# Patient Record
Sex: MALE | Race: ASIAN | Employment: UNEMPLOYED | URBAN - METROPOLITAN AREA
[De-identification: names, ages, dates, MRNs, and addresses within clinical notes are randomized per-mention and may not be internally consistent; named-entity substitution may affect disease eponyms.]

---

## 2020-03-26 ENCOUNTER — HOSPITAL ENCOUNTER (EMERGENCY)
Facility: CLINIC | Age: 25
Discharge: HOME | End: 2020-03-27
Attending: EMERGENCY MEDICINE | Admitting: EMERGENCY MEDICINE
Payer: COMMERCIAL

## 2020-03-26 DIAGNOSIS — T78.40XA ALLERGIC REACTION, INITIAL ENCOUNTER: ICD-10-CM

## 2020-03-26 PROCEDURE — 25000125 ZZHC RX 250: Performed by: EMERGENCY MEDICINE

## 2020-03-26 PROCEDURE — 96372 THER/PROPH/DIAG INJ SC/IM: CPT

## 2020-03-26 PROCEDURE — 96374 THER/PROPH/DIAG INJ IV PUSH: CPT

## 2020-03-26 PROCEDURE — 96375 TX/PRO/DX INJ NEW DRUG ADDON: CPT

## 2020-03-26 PROCEDURE — 99284 EMERGENCY DEPT VISIT MOD MDM: CPT | Mod: 25

## 2020-03-26 PROCEDURE — 96361 HYDRATE IV INFUSION ADD-ON: CPT

## 2020-03-26 RX ORDER — DIPHENHYDRAMINE HYDROCHLORIDE 50 MG/ML
25 INJECTION INTRAMUSCULAR; INTRAVENOUS ONCE
Status: COMPLETED | OUTPATIENT
Start: 2020-03-26 | End: 2020-03-27

## 2020-03-26 RX ORDER — METHYLPREDNISOLONE SODIUM SUCCINATE 125 MG/2ML
125 INJECTION, POWDER, LYOPHILIZED, FOR SOLUTION INTRAMUSCULAR; INTRAVENOUS ONCE
Status: COMPLETED | OUTPATIENT
Start: 2020-03-26 | End: 2020-03-27

## 2020-03-26 RX ORDER — LIDOCAINE 40 MG/G
CREAM TOPICAL
Status: DISCONTINUED | OUTPATIENT
Start: 2020-03-26 | End: 2020-03-27 | Stop reason: HOSPADM

## 2020-03-26 RX ADMIN — EPINEPHRINE 0.3 MG: 1 INJECTION INTRAMUSCULAR; INTRAVENOUS; SUBCUTANEOUS at 23:57

## 2020-03-26 ASSESSMENT — ENCOUNTER SYMPTOMS
SHORTNESS OF BREATH: 1
FACIAL SWELLING: 1

## 2020-03-26 ASSESSMENT — MIFFLIN-ST. JEOR: SCORE: 1771.9

## 2020-03-26 NOTE — ED AVS SNAPSHOT
[facility]  [address]  [facility] [address]  Phone:  [phone]  Fax:  [phone]                                    Clifford Gonzalez   MRN: [id number]    Department:  [facility]   Date of Visit:  3/26/2020           After Visit Summary Signature Page    I have received my discharge instructions, and my questions have been answered. I have discussed any challenges I see with this plan with the nurse or doctor.    ..........................................................................................................................................  Patient/Patient Representative Signature      ..........................................................................................................................................  Patient Representative Print Name and Relationship to Patient    ..................................................               ................................................  Date                                   Time    ..........................................................................................................................................  Reviewed by Signature/Title    ...................................................              ..............................................  Date                                               Time          22EPIC Rev 08/18

## 2020-03-27 VITALS
OXYGEN SATURATION: 98 % | DIASTOLIC BLOOD PRESSURE: 99 MMHG | SYSTOLIC BLOOD PRESSURE: 127 MMHG | HEIGHT: 66 IN | WEIGHT: 185 LBS | RESPIRATION RATE: 16 BRPM | TEMPERATURE: 98 F | BODY MASS INDEX: 29.73 KG/M2

## 2020-03-27 PROCEDURE — 25000128 H RX IP 250 OP 636: Performed by: EMERGENCY MEDICINE

## 2020-03-27 PROCEDURE — 96374 THER/PROPH/DIAG INJ IV PUSH: CPT

## 2020-03-27 PROCEDURE — 25800030 ZZH RX IP 258 OP 636: Performed by: EMERGENCY MEDICINE

## 2020-03-27 PROCEDURE — 25000125 ZZHC RX 250: Performed by: EMERGENCY MEDICINE

## 2020-03-27 PROCEDURE — 96361 HYDRATE IV INFUSION ADD-ON: CPT

## 2020-03-27 PROCEDURE — 96375 TX/PRO/DX INJ NEW DRUG ADDON: CPT

## 2020-03-27 RX ORDER — EPINEPHRINE 0.3 MG/.3ML
0.3 INJECTION SUBCUTANEOUS
Qty: 1 EACH | Refills: 0 | Status: SHIPPED | OUTPATIENT
Start: 2020-03-27

## 2020-03-27 RX ORDER — PREDNISONE 20 MG/1
TABLET ORAL
Qty: 10 TABLET | Refills: 0 | Status: SHIPPED | OUTPATIENT
Start: 2020-03-27 | End: 2020-06-02

## 2020-03-27 RX ADMIN — SODIUM CHLORIDE 1000 ML: 9 INJECTION, SOLUTION INTRAVENOUS at 00:09

## 2020-03-27 RX ADMIN — FAMOTIDINE 20 MG: 10 INJECTION INTRAVENOUS at 00:07

## 2020-03-27 RX ADMIN — METHYLPREDNISOLONE SODIUM SUCCINATE 125 MG: 125 INJECTION, POWDER, FOR SOLUTION INTRAMUSCULAR; INTRAVENOUS at 00:06

## 2020-03-27 RX ADMIN — DIPHENHYDRAMINE HYDROCHLORIDE 25 MG: 50 INJECTION, SOLUTION INTRAMUSCULAR; INTRAVENOUS at 00:09

## 2020-03-27 NOTE — ED NOTES
Patient laying on cot easy and unlabored breathing.  Has no immediate needs at this time. Will continue to monitor

## 2020-03-27 NOTE — DISCHARGE INSTRUCTIONS
Avoid any shrimp or foods that you ate tonight until testing is done  Epipen to be carried with you at all times  Prednisone for 5 days  Follow up with clinic to get allergy testing

## 2020-03-27 NOTE — ED PROVIDER NOTES
"  History   Chief Complaint:  Allergic Reaction    HPI   Clifford Gonzalez is a 24 year old otherwise healthy male who presents for evaluation of an allergic reactions. The patient states after eating shrimp and asparagus he had acute onset hives on his entire body that were itchy. He began having difficulty breathing secondary to the swelling in his throat, prompting him to take 50 mg Benadryl 20-30 minutes prior to arrival.    Here, the patient states he has ate shrimp and asparagus before and denies any known new exposures. He denies any other symptoms prompting his presentation.     Allergies:  No Known Drug Allergies     Medications:   The patient is not currently taking any prescribed medications.     Past Medical History:    The patient denies any relevant past medical history.      Past Surgical History:    History reviewed. No pertinent past surgical history.     Family History:    The patient denies any relevant family medical history.     Social History:  The patient was unaccompanied to the ED.  Smoking Status: Current  Smokeless Tobacco: Never Used  Alcohol Use: Yes  Drug Use: No  PCP: No primary care provider on file.     Review of Systems   HENT: Positive for facial swelling.    Respiratory: Positive for shortness of breath.    Skin: Positive for rash.   All other systems reviewed and are negative.      Physical Exam     Patient Vitals for the past 24 hrs:   BP Temp Temp src Heart Rate Resp SpO2 Height Weight   03/27/20 0150 -- -- -- 98 -- 98 % -- --   03/27/20 0149 -- -- -- 98 -- 97 % -- --   03/27/20 0148 -- -- -- 96 -- 99 % -- --   03/27/20 0103 -- -- -- 101 -- 100 % -- --   03/27/20 0101 -- -- -- 100 -- 97 % -- --   03/27/20 0100 -- -- -- 96 -- 97 % -- --   03/27/20 0059 -- -- -- 97 -- 98 % -- --   03/27/20 0058 -- -- -- 106 -- 97 % -- --   03/27/20 0000 (!) 127/99 -- -- 100 -- 100 % -- --   03/26/20 2349 (!) 127/99 98  F (36.7  C) Oral 118 18 100 % 1.676 m (5' 6\") 83.9 kg (185 lb)     Physical " Exam  Constitutional:       Appearance: He is well-developed.   HENT:      Right Ear: Tympanic membrane and external ear normal.      Left Ear: Tympanic membrane and external ear normal.      Nose: Nose normal.      Mouth/Throat:      Mouth: Mucous membranes are moist.      Pharynx: Oropharynx is clear. No oropharyngeal exudate or posterior oropharyngeal erythema.      Comments: No lip or tongue swelling  Eyes:      General: No scleral icterus.     Conjunctiva/sclera: Conjunctivae normal.      Pupils: Pupils are equal, round, and reactive to light.   Neck:      Musculoskeletal: Normal range of motion and neck supple.   Cardiovascular:      Rate and Rhythm: Normal rate and regular rhythm.      Heart sounds: Normal heart sounds. No murmur. No friction rub. No gallop.    Pulmonary:      Effort: Pulmonary effort is normal. No respiratory distress.      Breath sounds: Normal breath sounds. No wheezing or rales.   Abdominal:      General: Bowel sounds are normal. There is no distension.      Palpations: Abdomen is soft. There is no mass.      Tenderness: There is no abdominal tenderness.   Skin:     General: Skin is warm and dry.      Capillary Refill: Capillary refill takes less than 2 seconds.      Findings: Rash (Diffuse hives) present.   Neurological:      General: No focal deficit present.      Mental Status: He is alert.   Psychiatric:         Mood and Affect: Mood normal.         Emergency Department Course   Interventions:  2357 Epinephrine 0.3 mg IM  0006 Solu-medrol 125 mg IV  0007 Pepcid 20 mg IV  0009 Benadryl 25 mg IV  0009 0.9% NaCl bolus 1000 mL IV     Emergency Department Course:  Past medical records, nursing notes, and vitals reviewed.    (5221)   I performed an exam of the patient as documented above. History obtained from patient.     (3698)   I rechecked the patient and discussed plan of care. His symptoms are gone and he feels improved.     Findings and plan explained to the Patient. Patient  discharged home with instructions regarding supportive care, medications, and reasons to return. The importance of close follow-up was reviewed. The patient was prescribed Deltasone and epinephrine. I personally answered all related questions prior to discharge.     Impression & Plan   Medical Decision Making:  Clifford Gonzalez is a 24 year old male who presents with complaint of allergic reaction.  The patient has hives but no signs of airway compromise or anaphylaxis.  There are no new exposures to suggest a specific allergen.  The patient was treated with Benadryl, Pepcid, and solu-medrol and observed for 2 hours.  At this point there are no signs of worsening clinical status and I believe the patient is safe for discharge home.  I discharged with prescriptions for deltasone and epipen should he develop signs of anaphylaxis.  Recommended follow-up with PCP in 1-2 days for persistent symptoms and given precautions to return to ED should symptoms worsen/change.     Diagnosis:    ICD-10-CM    1. Allergic reaction, initial encounter  T78.40XA      Disposition:  Discharged to home.    Discharge Medications:     Medication List      Started    EPINEPHrine 0.3 MG/0.3ML injection 2-pack  Commonly known as:  ANY BX GENERIC EQUIV  0.3 mg, Intramuscular, ONCE PRN     predniSONE 20 MG tablet  Commonly known as:  DELTASONE  Take two tablets (= 40mg) each day for 5 (five) days             Scribe Disclosure:  I, Shan Dozier, am serving as a scribe at 11:53 PM on 3/26/2020 to document services personally performed by Khurram Bonner MD based on my observations and the provider's statements to me.  March 26, 2020   [facility] EMERGENCY DEPARTMENT        Khurram Bonner MD  03/27/20 0622

## 2020-03-27 NOTE — ED TRIAGE NOTES
Pt presents for evaluation for possible allergic reaction. Pt ate shrimp around 1730, then about 1.5 hours ago pt began to get a full body rash, erythema, itchiness, clearing throat, difficulty swallowing. Pt took 2 tablets of benadryl about 30 minutes ago. Denies any shortness of breath.

## 2020-06-02 ENCOUNTER — VIRTUAL VISIT (OUTPATIENT)
Dept: INTERNAL MEDICINE | Facility: CLINIC | Age: 25
End: 2020-06-02
Payer: COMMERCIAL

## 2020-06-02 DIAGNOSIS — T78.03XS ANAPHYLACTIC SHOCK DUE TO FISH, SEQUELA: Primary | ICD-10-CM

## 2020-06-02 PROCEDURE — 99203 OFFICE O/P NEW LOW 30 MIN: CPT | Mod: 95 | Performed by: INTERNAL MEDICINE

## 2020-06-02 ASSESSMENT — ENCOUNTER SYMPTOMS
FEVER: 0
WHEEZING: 0
VOICE CHANGE: 0

## 2020-06-02 NOTE — PROGRESS NOTES
"Clifford Gonzalez is a 24 year old male who is being evaluated via a billable video visit.      The patient has been notified of following:     \"This video visit will be conducted via a call between you and your physician/provider. We have found that certain health care needs can be provided without the need for an in-person physical exam.  This service lets us provide the care you need with a video conversation.  If a prescription is necessary we can send it directly to your pharmacy.  If lab work is needed we can place an order for that and you can then stop by our lab to have the test done at a later time.    Video visits are billed at different rates depending on your insurance coverage.  Please reach out to your insurance provider with any questions.    If during the course of the call the physician/provider feels a video visit is not appropriate, you will not be charged for this service.\"    Patient has given verbal consent for Video visit? Yes    How would you like to obtain your AVS? Mail a copy    Patient would like the video invitation sent by: Text to cell phone: [phone] (H)    Will anyone else be joining your video visit? No    Subjective     Clifford Gonzalez is a 24 year old male who presents today via video visit for the following health issues:    Hospitals in [place]   Video Start Time: 3:07 PM    Follow up from ER visit in 3/26/20 for allergic reaction  Went to ER in march after eating Shrimp and asparagus. Patient developed hives and felt like his throat was closing.  He was given epinephrine, solumedrol, Pepcid,benadryl.   His symptoms improved and advised to follow-up with PCP.  Has h/o seasonal allergies. No known allergies to food. Interested in seeing allergy specialist.     There is no problem list on file for this patient.    History reviewed. No pertinent surgical history.    Social History     Tobacco Use     Smoking status: Current Every Day Smoker     Smokeless tobacco: Never Used     Tobacco comment: " "vapes   Substance Use Topics     Alcohol use: Not on file     Comment: occasionally     Family History   Problem Relation Age of Onset     Thyroid Disease No family hx of          Current Outpatient Medications   Medication Sig Dispense Refill     EPINEPHrine (ANY BX GENERIC EQUIV) 0.3 MG/0.3ML injection 2-pack Inject 0.3 mLs (0.3 mg) into the muscle once as needed for anaphylaxis 1 each 0     No Known Allergies    Reviewed and updated as needed this visit by Provider      Review of Systems   Constitutional: Negative for fever.   HENT: Negative for voice change.    Respiratory: Negative for wheezing.    Skin: Negative for rash.          Objective    There were no vitals taken for this visit.  Estimated body mass index is 29.86 kg/m  as calculated from the following:    Height as of 3/26/20: 1.676 m (5' 6\").    Weight as of 3/26/20: 83.9 kg (185 lb).  Physical Exam   \"GENERAL: Healthy, alert and no distress\",\"EYES: Eyes grossly normal to inspection.  No discharge or erythema, or obvious scleral/conjunctival abnormalities.\",\"RESP: No audible wheeze, cough, or visible cyanosis.  No visible retractions or increased work of breathing.  \",\"SKIN: Visible skin clear. No significant rash, abnormal pigmentation or lesions.\",\"NEURO: Cranial nerves grossly intact.  Mentation and speech appropriate for age.\",\"PSYCH: Mentation appears normal, affect normal/bright, judgement and insight intact, normal speech and appearance well-groomed.\"    Assessment & Plan     Clifford was seen today for video visit.    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 Location): Home    Distant Location (provider location):  HOME    Platform used for Video Visit: Jordon Nowak MD        "

## 2020-06-02 NOTE — PATIENT INSTRUCTIONS
N: [facility] [phone]       Patient Education     Anaphylaxis  Anaphylaxis is a severe allergic reaction that can be life threatening. This reaction can happen in a few minutes, or a few hours after exposure to what you are allergic to. Some people are more prone to this than others.  The symptoms of an anaphylactic reaction may at first seem similar to other allergic reactions. If this has happened to you in the past, do not let the initial mild symptoms, such as a rash, hives and itching, mislead you. Your reaction can worsen very quickly and become much more severe and life threatening within minutes.  More severe symptoms include:    Trouble swallowing, feeling like your throat is closing    Trouble breathing, wheezing    Cool, moist or pale (blue in color) skin    Hoarse voice or trouble speaking    Nausea, vomiting, diarrhea, stomach cramps, or pain    Feeling faint or lightheaded, rapid heart rate, low blood pressure    Feeling dizzy or confused    Becoming very drowsy, poorly responsive, or trouble awakening    Seizure  Sometimes the cause may be obvious, like knowing you are allergic to peanuts. To help identify your allergen, remember:    When it started    What you were doing at the time or just before that    Any activities you were involved in    Any new products or contacts   Here are some common causes, but remember almost anything can cause a reaction, and you may not even be aware that you came into contact with one of these things.    Dust, mold, pollen    Plants, such as poison ivy and poison oak    Animals    Foods such as shrimp, shellfish, peanuts, milk products, gluten, eggs; also colorings, flavorings, additives    Insect bites or stings such as bees, mosquitos, fleas, ticks    Medicines such as penicillin, sulfa drugs, amoxicillin, aspirin, ibuprofen; any medicine can cause a reaction    Jewelry such as nickel, or gold (new, or something  you ve worn for a while including zippers, and buttons)    Latex such as in gloves, clothes, toys, balloons, or some tapes (some people allergic to latex may also  have problems with foods like bananas, avocados, kiwi, papaya, or chestnuts)    Lotions, perfumes, cosmetics, soaps, shampoos, skincare products, nail products    Chemicals or dyes in clothing, linen, , hair dyes, soaps, iodine  If you are exposed to the same substance again, you may have the same or more severe reaction. Treatment for anaphylaxis is epinephrine (adrenalin). This is available by prescription as a self-injectable pen. If the cause of your reaction is known, you should avoid exposure in the future. If the cause is not known, follow up with your doctor for special testing to determine what you are allergic to.     Home care  If it was safe for you to go home, watch for any worsening of symptoms. You may need to be treated again.  Medicines  Injectable epinephrine  One of the key tools in treating anaphylaxis is early use of epinephrine. If you had a severe allergic or anaphylactic reaction, the doctor may prescribe a self- injectable epinephrine kit. If this was prescribed, carry it at all times. It can be life saving. Epinephrine can help stop the progression of an allergic reaction. Its effects are brief, so after you use the medicine, it is still very important to call 911 and get to an emergency room.  When to use injectable epinephrine. Use the epinephrine if you have a history of severe reactions or any of the following symptoms:    Swelling in your mouth or throat     Trouble speaking or swallowing    Trouble breathing    Feeling faint, low blood pressure, or becoming drowsy or poorly responsive    Worsening rash  How to use injectable epinephrine:    Hold the syringe firmly in your hand with the orange (or black) needle end away from your thumb    Be careful not to stick your fingers or hand with the needle.    At the  opposite end, pull off the activation cap- the blue or grey tab    Holding the syringe tightly, jab it into the outer part of your upper thigh. This is one of the softest, fleshiest parts of the upper leg, and is not near a major blood vessel or nerve. Be careful not to inject it into your hip or any place that there is a pulse.    You can inject it through pants, but make sure not to inject it into the seam of the pants.    Do not pull it out right away. Try to hold the needle in place for 10 seconds.    Massage the spot for a few seconds or as directed by your healthcare provider.    If you are injecting it in someone else or a child, try to hold them or their leg still. If they jerk or yank their legs away as you are doing it, it can cause a cut on their leg.  You may feel shaky, jittery, nervous, and anxious after the injection. Although it is difficult, try to relax. This is a side effect of the epinephrine, and should stop after a few minutes   Important    Get to the emergency room after using the epinephrine. Its affect will wear off, and you may have a second reaction. This could even happen hours later.    Never intentionally eat, use, or expose yourself to the substance that caused the anaphylactic reaction.  Nothing is foolproof, including the injectable epinephrine.  Other medicines  The doctor may prescribe medicine to relieve swelling, itching, and pain. Follow the doctor s instructions when using this medicine.     Oral diphenhydramine is an antihistamine available at drug and grocery stores. Unless a prescription antihistamine was given, diphenhydramine may be used to reduce itching if large areas of the skin are involved. It may make you sleepy, so be careful using it in the daytime or when going to school, working, or driving.     Do not use diphenhydramine cream on your skin, because in some people it can cause a further reaction.    You may use over-the-counter acetaminophen or ibuprofen to  control pain, unless another pain medicine was prescribed.    If you were prescribed any medicines to prevent symptoms from returning, be sure to take them exactly as directed.  General care    Rest at home for the next 24 hours.    Avoid tobacco and alcohol consumption. These may worsen your symptoms.    If you know what caused your reaction today, avoid that in the future since the next reaction may be worse. Let your family members, friends and personal physician know about your allergic reaction.    If your allergy was to food, learn how to read food labels so you can check for that ingredient. If a product does not have a label, it is best to avoid it.    Consider carrying an identification card or getting a medical alert bracelet to inform medical personnel of your condition in case you cannot tell them.    Tell all of your healthcare providers that you had an anaphylactic reaction. Make certain the information is added to your electronic or paper medical records.  Follow-up care  Follow up with your doctor or as advised if you are not improving over the next 1 to 2 days.  Call 911  Call 911 if any of these occur:    Trouble breathing or swallowing, wheezing    Hoarse voice or trouble speaking    Chest pain    Confused    Very drowsy or trouble awakening    Fainting or loss of consciousness    Rapid heart rate    Vomiting blood, or large amounts of blood in stool    Seizure    Swelling in the eyes, mouth, face, or tongue    Dizziness or weakness    Cool, moist, or pale (blue in color) skin    Nausea, vomiting, diarrhea, stomach cramps, or abdominal pain  When to seek medical advice  Call your healthcare provider right away if any of these occur:    Worsening of your symptoms    New symptoms develop    Symptoms don't go away or come back  Date Last Reviewed: 4/1/2017 2000-2019 [employer]. [address] [place] [address], [place] [address]. All rights reserved. This information is not intended as a  substitute for professional medical care. Always follow your healthcare professional's instructions.